# Patient Record
Sex: FEMALE | Race: BLACK OR AFRICAN AMERICAN
[De-identification: names, ages, dates, MRNs, and addresses within clinical notes are randomized per-mention and may not be internally consistent; named-entity substitution may affect disease eponyms.]

---

## 2017-12-18 ENCOUNTER — HOSPITAL ENCOUNTER (OUTPATIENT)
Dept: HOSPITAL 61 - KCIC MAMMO | Age: 67
Discharge: HOME | End: 2017-12-18
Attending: FAMILY MEDICINE
Payer: COMMERCIAL

## 2017-12-18 DIAGNOSIS — Z12.31: Primary | ICD-10-CM

## 2017-12-18 NOTE — [facility]
Bilateral digital screening mammograms:

 

Reason for examination: Routine screening.

 

Comparison is made to previous studies dated 12/7/2016 and 12/30/2015.

 

Interpretation was made with the benefit of CAD.

 

The skin and nipples show no abnormalities. No abnormal lymph nodes are 

seen. The breast parenchyma is predominantly fatty. (Breast density: 

Category A.) There are no dominant masses, suspicious calcifications or 

architectural distortions. Benign appearing calcifications are again seen.

 

Impression:

 

No evidence of malignancy. Recommend routine screening.

 

BI-RADS Category 2:  Benign.

 

"Our facility is accredited by the [employer] 

Mammography Program."

 

This patient's information has been entered into a reminder system for the

patient to be notified with the results of her examination and a target 

date for the next mammogram.

 

Electronically signed by: Chana Klein MD (12/18/2017 2:19 PM) [id number]

## 2018-09-05 NOTE — EKG
[facility]

              [address] [place] [address]

Test Date:    2018               Test Time:    22:47:20

Pat Name:     MARISSA BACON           Department:   

Patient ID:   [id number]           Room:         258 1

Gender:       F                        Technician:   CQ

:          1950               Requested By: LUIS NEGRETE

Order Number: [id number]           Reading MD:   Luis Negrete MD

                                 Measurements

Intervals                              Axis          

Rate:         104                      P:            0

GA:           132                      QRS:          -5

QRSD:         104                      T:            132

QT:           366                                    

QTc:          488                                    

                           Interpretive Statements

ATRIAL FIBRILLATION

NON-SPECIFIC ST/T CHANGES

Electronically Signed On 2018 9:04:36 CDT by Luis Negrete MD

## 2018-09-06 NOTE — PDOC2
DELICIA PACK APRN 9/6/18 0909:


CARDIAC CONSULT


DATE OF CONSULT


Date of Consult


DATE: 9/6/18 


TIME: 08:58





REASON FOR CONSULT


Reason for Consult:


New afib





REFERRING PHYSICIAN


Referring Physician:


Forrest





SOURCE


Source:  Chart review, Patient





HISTORY OF PRESENT ILLNESS


HISTORY OF PRESENT ILLNESS


This is a pleasant 67 yo female admitted for complains of chest pain and SOA.  

Reports that about 3 days ago she was having cramps to her thigh and butt. In 

the last few days this was then followed by midchest pressure which then went 

to her left neck and then have tingling to her left fingertips.  No low back 

pain and no LE pain with activities. Positive for SOA but no n/v and 

diaphoresis.  Denies any palpitations and no recent falls or injury.  Prior to 

this she denies any exertional CP and CONTI.  Denies any past VTE or arrhythmias 

but positive for remote CAD with stent placement as well as past CVA.  Denies 

any heartburn, [place] symptoms.  She does have occipital HA after she started 

having symptoms mentioned. Verbalized compliance with her medications. She went 

to her PCP yesterday and was noted with AFIB RVR and was directly admitted. To 

add she was suppose to have a stress test 2/2018 but there was a mixed up with 

her outpt copay and was too expensive for her.





PAST MEDICAL HISTORY


Cardiovascular:  CAD, HTN, Hyperlipidemia


Pulmonary:  No pertinent hx


CENTRAL NERVOUS SYSTEM:  CVA


GI:  GERD


Heme/Onc:  No pertinent hx


Hepatobiliary:  No pertinent hx


Psych:  No pertinent hx


Musculoskeletal:  Osteoarthritis


Rheumatologic:  No pertinent hx


Infectious disease:  No pertinent hx


ENT:  No pertinent hx


Renal/:  No pertinent hx


Endocrine:  No pertinent hx


Dermatology:  No pertinent hx





PAST SURGICAL HISTORY


Past Surgical History:  Hysterectomy, Other (remote PCI/stent)





FAMILY HISTORY


Family History


noncontributory





SOCIAL HISTORY


Smoke:  No


ALCOHOL:  none


Drugs:  None


Lives:  with Family





CURRENT MEDICATIONS


CURRENT MEDICATIONS





Current Medications








 Medications


  (Trade)  Dose


 Ordered  Sig/Venita


 Route


 PRN Reason  Start Time


 Stop Time Status Last Admin


Dose Admin


 


 Metoprolol


 Tartrate


  (Lopressor)  25 mg  Q6HRS


 PO


   9/5/18 18:00


    9/6/18 06:00





 


 Apixaban


  (Eliquis)  5 mg  1X  ONCE


 PO


   9/5/18 18:00


 9/5/18 18:01 DC 9/5/18 18:19





 


 Apixaban


  (Eliquis)  5 mg  1X  ONCE


 PO


   9/6/18 06:00


 9/6/18 06:01 DC 9/6/18 06:00





 


 Amlodipine


 Besylate


  (Norvasc)  10 mg  DAILY


 PO


   9/6/18 09:00


    9/6/18 07:44





 


 Aspirin


  (Ecotrin)  81 mg  DAILY


 PO


   9/6/18 09:00


    9/6/18 07:44





 


 Hydrochlorothiazide


  (Hydrodiuril)  25 mg  DAILY


 PO


   9/6/18 09:00


    9/6/18 07:44














ALLERGIES


ALLERGIES:  


Coded Allergies:  


     No Known Drug Allergies (Unverified , 9/5/18)





ROS


Review of System


14 point ROS evaluated with pertinent positives noted per HPI





PHYSICAL EXAM


General:  Alert, Oriented X3, Cooperative, No acute distress


HEENT:  Atraumatic, Mucous membr. moist/pink


Lungs:  Clear to auscultation, Normal air movement


Heart:  Other (SFIB)


Abdomen:  Soft, No tenderness


Extremities:  No cyanosis, No edema, Other (2+ DP, 2+ femoral 1+ popliteal 

bilaterally)


Skin:  No breakdown, No significant lesion


Neuro:  Normal speech, Sensation intact


Psych/Mental Status:  Mental status NL, Mood NL


MUSCULOSKELETAL:  Osteoarthritic changes both hands





VITALS


VITALS





Vital Signs








  Date Time  Temp Pulse Resp B/P (MAP) Pulse Ox O2 Delivery O2 Flow Rate FiO2


 


9/6/18 08:29    155/103 (120)    


 


9/6/18 07:44  115      


 


9/6/18 07:30 98.4  20  97 Nasal Cannula 2.0 





 98.4       











LABS


Lab:





Laboratory Tests








Test


 9/5/18


18:15 9/6/18


00:45 9/6/18


06:10 9/6/18


06:40


 


White Blood Count


 5.7 x10^3/uL


(4.0-11.0) 


 


 5.1 x10^3/uL


(4.0-11.0)


 


Red Blood Count


 5.20 x10^6/uL


(3.50-5.40) 


 


 5.00 x10^6/uL


(3.50-5.40)


 


Hemoglobin


 13.3 g/dL


(12.0-15.5) 


 


 12.7 g/dL


(12.0-15.5)


 


Hematocrit


 40.4 %


(36.0-47.0) 


 


 39.1 %


(36.0-47.0)


 


Mean Corpuscular Volume 78 fL ()    78 fL () 


 


Mean Corpuscular Hemoglobin 26 pg (25-35)    25 pg (25-35) 


 


Mean Corpuscular Hemoglobin


Concent 33 g/dL


(31-37) 


 


 33 g/dL


(31-37)


 


Red Cell Distribution Width


 16.4 %


(11.5-14.5) 


 


 16.3 %


(11.5-14.5)


 


Platelet Count


 204 x10^3/uL


(140-400) 


 


 215 x10^3/uL


(140-400)


 


Neutrophils (%) (Auto) 67 % (31-73)    54 % (31-73) 


 


Lymphocytes (%) (Auto) 27 % (24-48)    36 % (24-48) 


 


Monocytes (%) (Auto) 4 % (0-9)    7 % (0-9) 


 


Eosinophils (%) (Auto) 1 % (0-3)    1 % (0-3) 


 


Basophils (%) (Auto) 1 % (0-3)    1 % (0-3) 


 


Neutrophils # (Auto)


 3.8 x10^3uL


(1.8-7.7) 


 


 2.8 x10^3uL


(1.8-7.7)


 


Lymphocytes # (Auto)


 1.5 x10^3/uL


(1.0-4.8) 


 


 1.8 x10^3/uL


(1.0-4.8)


 


Monocytes # (Auto)


 0.3 x10^3/uL


(0.0-1.1) 


 


 0.4 x10^3/uL


(0.0-1.1)


 


Eosinophils # (Auto)


 0.1 x10^3/uL


(0.0-0.7) 


 


 0.1 x10^3/uL


(0.0-0.7)


 


Basophils # (Auto)


 0.0 x10^3/uL


(0.0-0.2) 


 


 0.1 x10^3/uL


(0.0-0.2)


 


Sodium Level


 141 mmol/L


(136-145) 


 138 mmol/L


(136-145) 





 


Potassium Level


 3.4 mmol/L


(3.5-5.1) 


 3.4 mmol/L


(3.5-5.1) 





 


Chloride Level


 106 mmol/L


() 


 108 mmol/L


() 





 


Carbon Dioxide Level


 25 mmol/L


(21-32) 


 24 mmol/L


(21-32) 





 


Anion Gap 10 (6-14)   6 (6-14)  


 


Blood Urea Nitrogen


 13 mg/dL


(7-20) 


 10 mg/dL


(7-20) 





 


Creatinine


 0.8 mg/dL


(0.6-1.0) 


 0.8 mg/dL


(0.6-1.0) 





 


Estimated GFR


(Cockcroft-Gault) 86.3 


 


 86.3 


 





 


BUN/Creatinine Ratio 16 (6-20)    


 


Glucose Level


 137 mg/dL


(70-99) 


 106 mg/dL


(70-99) 





 


Calcium Level


 9.8 mg/dL


(8.5-10.1) 


 9.2 mg/dL


(8.5-10.1) 





 


Total Bilirubin


 0.5 mg/dL


(0.2-1.0) 


 


 





 


Aspartate Amino Transf


(AST/SGOT) 17 U/L (15-37) 


 


 


 





 


Alanine Aminotransferase


(ALT/SGPT) 25 U/L (14-59) 


 


 


 





 


Alkaline Phosphatase


 72 U/L


() 


 


 





 


Creatine Kinase


 58 U/L


() 


 


 





 


Creatine Kinase MB (Mass)


 1.0 ng/mL


(0.0-3.6) 


 


 





 


Creatine Kinase MB Relative


Index  % (0-4) 


 


 


 





 


Troponin I Quantitative


 < 0.017 ng/mL


(0.000-0.055) < 0.017 ng/mL


(0.000-0.055) < 0.017 ng/mL


(0.000-0.055) 





 


Total Protein


 7.5 g/dL


(6.4-8.2) 


 


 





 


Albumin


 3.6 g/dL


(3.4-5.0) 


 


 





 


Albumin/Globulin Ratio 0.9 (1.0-1.7)    











ASSESSMENT/PLAN


ASSESSMENT/PLAN


1. AFIB RVR: new onset


2. Accelerated HTN


3. CAD: remote stents x2


4. Hx of CVA


5. HLP





Recommendations


1. LUCY/CVN today, discussed risks and benefits and agreeable to proceed. 


2. Pt has received eliquis last night and this am. Discussed anticoagulation 

with eliquis and agreeable for stroke prevention


3. MPI tomorrow.  Will arrange for outpt event monitor


4. Continue metoprolol.  Norvasc/HCTZ in place and will replace K. Will reeval 

meds further after testing completion. 


5. Check Mg, TSH, and lipids.





WISAM NEGRETE MD 9/6/18 1022:


CARDIAC CONSULT


ASSESSMENT/PLAN


ASSESSMENT/PLAN


Pt. seen and examined. Agree with above NP note. 


Known to our service. Presenting with afib with rvr


I discussed with the patient and her daughter about various mgmt options 

including rate control +anticoagulation versus LUCY/CVN versus ablation down the 

road. 


She understands the risks/benefits of all the options and wishes to proceed 

with LUCY/CVN


She has been given anticoagulation with eliquis and will continue this post-

procedure.











DELICIA PACK Sep 6, 2018 09:09


WISAM NEGRETE MD Sep 6, 2018 10:22

## 2018-09-06 NOTE — EKG
[facility]

              [address] [place] [address]

Test Date:    2018               Test Time:    07:12:03

Pat Name:     MARISSA BACON           Department:   

Patient ID:   [id number]           Room:         258 1

Gender:       F                        Technician:   TROY

:          1950               Requested By: MARK CARIAS

Order Number: [id number]           Reading MD:   Luis Lambert MD

                                 Measurements

Intervals                              Axis          

Rate:         108                      P:            

MT:                                    QRS:          -5

QRSD:         102                      T:            88

QT:           362                                    

QTc:          489                                    

                           Interpretive Statements

ATRIAL FIBRILLATION WITH RVR

Electronically Signed On 2018 9:12:00 CDT by Luis Lambert MD

## 2018-09-06 NOTE — CARD
MR#: [id number]

Account#: [id number]

Accession#: [id number]

Date of Study: 09/06/2018

Ordering Physician: WISAM LAMBERT, 

Referring Physician: MARK CARIAS, 

Tech: ADONAY Delgado





--------------- APPROVED REPORT --------------





EXAM: Transesophageal echocardiogram with color flow Doppler and Synchronized Cardioversion.



INDICATION

Cardioversion



Reason For Test : Rule out Intracardiac Thrombus.



PROCEDURE

After obtaining informed consent, patient underwent transesophageal echo in the PACU.

Type of Sedation : General Anesthesia

Sedation was administered by Zuleima Cam. 

Sedation was achieved with Propofol 70mg intravenously. 

Transesophageal probe was inserted and advanced into esophagus by Benjamin Lambert MD.

The LUCY was performed without complications. 

Synchronized Cardioversion attempted: Successful 

Synchronized Cardioversion acheived with 200 Joules after 1 attempt(s). 

Throughout the procedure, the blood pressure, pulse oximetry, cardiac rhythm, and rate were monitored
.

The patient tolerated the procedure without adverse effects. Recovery from general anesthesia was une
ventful and vital signs were stable.



 LEFT VENTRICLE 

The left ventricle is normal size. There is normal left ventricular wall thickness. The systolic func
tion is mildly impaired. EF 45% Mild global hypokinesis. No left ventricle thrombus noted on this inge
dy. There is no ventricular septal defect visualized.



 RIGHT VENTRICLE 

The right ventricle is normal size. There is normal right ventricular wall thickness. The right ventr
icular systolic function is normal.



 ATRIA 

The left atrium is mildly dilated. The right atrium is mildly dilated. The interatrial septum is inta
ct with no evidence for an atrial septal defect or patent foramen ovale as noted on 2-D or Doppler im
aging. There is no thrombus noted in the left atrial appendage.



 AORTIC VALVE 

The aortic valve is mildly thickened but opens well. Doppler and Color Flow revealed no significant a
ortic regurgitation. There is no significant aortic valvular stenosis. There is no aortic valvular ve
getation.



 MITRAL VALVE 

The mitral valve is normal in structure, There is no evidence of mitral valve prolapse. There is no m
itral valve stenosis. Doppler and Color-flow revealed trace to mild mitral regurgitation.



 TRICUSPID VALVE 

The tricuspid valve leaflets are thickened , but open well. Doppler and Color Flow revealed mild tric
uspid regurgitation. There is no tricuspid valve prolapse or vegetation. There is no tricuspid valve 
stenosis.



 PULMONIC VALVE 

The pulmonic valve is not well visualized. Doppler and Color Flow revealed no pulmonic valvular regur
gitation. There is no pulmonic valvular stenosis.



 GREAT VESSELS 

The aortic root is normal in size. The IVC is normal in size and collapses >50% with inspiration.



Critical Notification

Critical Value: No



<Conclusion>

The systolic function is mildly impaired. EF 45%

Mild global hypokinesis.

There is no thrombus noted in the left atrial appendage.

Successful cardioversion to SR. 



Signed by : Wisam Lambert, 

Electronically Approved : 09/06/2018 13:19:18

## 2018-09-06 NOTE — HP
ADMIT DATE:  2018



CHIEF COMPLAINT:  Chest pain.



HISTORY OF PRESENT ILLNESS AND HOSPITAL COURSE:  This patient is a 68-year-old

-American female who came in for routine followup in the clinic.  Most

recently, she was having back pain, but was also having chest pain and some

shortness of breath.  She was scheduled and did see Dr. Lambert in 2018 and

MPI was recommended.  Due to scheduling and financial error, the patient did not

receive medical stress at that time and states that she continued to have

intermittent symptoms.  During office evaluation, she was found to have

tachycardia and EKG confirmed new onset atrial fibrillation with rapid

ventricular response.  Due to this finding, she was admitted to the hospital for

further evaluation and treatment of atrial fibrillation.



PAST MEDICAL HISTORY:  Significant for:

1.  Hypertension.

2.  Remote history of CVA.

3.  High cholesterol.

4.  Asthma.

5.  Osteoarthritis.

6.  Venous insufficiency of lower extremities.

7.  Diet controlled diabetes.



FAMILY HISTORY:  Mother is  with hypertension complications at age 67. 

Father is  with coronary artery disease and hypertension at age 64.  She

has a sister who is  with heart disease at an early age of 42.



SOCIAL HISTORY:  The patient has never smoked.  She does not use alcohol.  She

lives alone.  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  She is  with

social support from daughter and granddaughter and sister.



REVIEW OF SYSTEMS:  The patient denies significant shortness of breath, but does

have intermittent chest pain.  She also has neck pain and low back pain for

which she has been seen in the office.  She denies any nausea, vomiting,

diarrhea, cough, congestion, fever, chills, recent weight gain or weight loss.



PHYSICAL EXAMINATION:

GENERAL:  This is a well-nourished, well-developed -American female, in

no apparent distress on my exam.  She is alert and oriented x 3.

HEENT:  Benign.

NECK:  Supple without bruit.

CARDIAC:  Irregularly irregular.

LUNGS:  Clear.

ABDOMEN:  Soft, nontender.

EXTREMITIES:  2+ pulses, 1+ edema.

NEUROLOGIC:  Showed no unilateral findings.



ASSESSMENT:

1.  New onset atrial fibrillation.

2.  Atypical chest pain.

3.  Essential hypertension.

4.  Type 2 diabetes with diet-controlled.

5.  High cholesterol.

6.  Chronic low back pain.



PLAN:  To proceed with cardiology consultation and treatment for atrial

fibrillation as well as evaluation and workup for coronary artery disease.

 



______________________________

MARK CARIAS MD



DR:  RUPERT/lisseth  JOB#:  [id number] / [id number]

DD:  2018 18:31  DT:  2018 19:09

## 2018-09-06 NOTE — RAD
Portable chest, 9/5/2018:

 

HISTORY: Chest pain, hypertension

 

The heart is at the upper limits of normal in size. The pulmonary 

vascularity is normal. No pulmonary infiltrate is seen. There is no 

evidence of pleural fluid. Moderate spurring is present in the spine.

 

IMPRESSION:

1. Borderline cardiomegaly.

2. No acute infiltrates.

 

Electronically signed by: Rick Moritz, MD (9/6/2018 7:40 AM) [facility]

## 2018-09-06 NOTE — EKG
[facility]

              [address] [place] [address]

Test Date:    2018               Test Time:    13:18:08

Pat Name:     MARISSA BACON           Department:   

Patient ID:   [id number]           Room:         258 1

Gender:       F                        Technician:   SILVER

:          1950               Requested By: LUIS NEGRETE

Order Number: [id number]           Reading MD:   Luis Negrete MD

                                 Measurements

Intervals                              Axis          

Rate:         65                       P:            42

LA:           180                      QRS:          -20

QRSD:         104                      T:            111

QT:           404                                    

QTc:          421                                    

                           Interpretive Statements

SINUS RHYTHM

PROBABLE LVH

NON-SPECIFIC ST/T CHANGES

Electronically Signed On 2018 6:50:42 CDT by Luis Negrete MD

## 2018-09-07 NOTE — RAD
MR#: [id number]

Account#: [id number]

Accession#: [id number]

Date of Study: 09/07/2018

Ordering Physician: DELICIA PACK, 

Referring Physician: LEOBARDO CISNEROS Tech: RT Prudencio (R) (N)





--------------- APPROVED REPORT --------------





Test Type:          Pharmacological

Stress Nurse/Tech: KELLEY Chase

Test Indications: CAD, CP, hx A fib

Cardiac History: stents, CVA

Medications:     See Electronic Medical Record

Medical History: See Electronic Medical Record

Resting ECG:     SR

Resting Heart Rate: 71 bpm

Resting Blood Pressure: 132/73mmHg

Pretest Chest Pain: None



Nurse/Tech Notes

Lungs CTA, S1S2

Consent: The procedure was explained to the patient in lay terms. Informed consent was witnessed. Perez
eout was entered into [employer]. History and Stress Test performed by KELLEY Chase RN



Pharm. Details

Pharmacologic stress testing was performed using 0.4mg per 5ml of regadenoson given intravenously ove
r 7-10 seconds.



Stress Symptoms

dyspnea



POST EXERCISE

Reason for Termination: Infusion complete

Max HR: 132 bpm

Max Blood Pressure: 136/71mmHg

Blood Pressure response to exercise: Normal blood pressure response during stress.

Heart Rate response to exercise: normal response

Chest Pain: No. 

Arrhythmia: No. 

ST Change: No. 



INTERPRETATION

Stress EKG Conclusion: No evidence of stress induced EKG changes. 



Imaging Protocol

IMAGE PROTOCOL: Rest Tc-99m/stress Tc-99m 1 day



Rest:            Stress:         Viability:   

Radiopharm.Tc99m MzqvdzuslTk71c Sestamibi

Txlt68tVp            35mCi            

Duration    14min.           14min.           

Img Date  09/07/2018 09/07/2018      

Inj-Img Wyzi35pep.           60min.           



Rest Admin Site:IV - Left ForearmAdministrator:RT Prudencio (R)(N)

Stress Admin Site: IV - Left ForearmAdministrator: LEOBARDO WardTCB, ARRT (R)(N)



STRESS DATA

End Diast. Vol.122.0mlLVEDV index BSA61.0ml

End Syst. Vol.59.0mlLVESV index BSA30.0ml

Myocardial Xfhj005.0gEject. Xrysltzg80.0%



Stress Scores

Regional WT3.00Summed WT17.00

Regional WM0.00Summed WM12.00



LV Perfusion

There is a small sized, reversible perfusion defect involving the inferior wall, suggestive of artifa
ct but cannot rule out mildly impaired perfusion. 



Wall Motion

Mild global hypokinesis. EF 52%



LV Perf. Quant

17 Seg. SSS9.00

17 Seg. SRS1.00

17 Seg. SDS8.00

Stress Defect Extent (% LAD)10.00Rest Defect Extent (% LAD)0.60Rev. Defect Extent (% LAD)1.30

Stress Defect Extent (% LCX) 25.00Rest Defect Extent (% LCX)0.00Rev. Defect Extent (% LCX)18.80

Stress Defect Extent (% RCA)5.60Rest Defect Extent (% RCA)0.00Rev. Defect Extent (% RCA)5.60

Stress Defect Extent (% WOLFGANG)14.60Rest Defect Extent (% WOLFGANG)0.20Rev. Defect Extent (% WOLFGANG)9.30



Other Information

Quality:Average

Risk Assessment: Low-Moderate Risk



Conclusion

1. No evidence of EKG changes with stress testing.

2. Small reversible inferior defect, likely artifact.

3. Low to moderate risk study.

4. EF is low normal at 52%.



Signed by : Luis Lambert, 

Electronically Approved : 09/07/2018 11:56:23

## 2018-09-07 NOTE — PDOC
CARDIO Progress Notes


Date and Time


Date of Service


9/7/2018


Time of Evaluation


1200





Subjective


Subjective:  No Chest Pain, No shortness of breath, No Palpitations





Vitals


Vitals





Vital Signs








  Date Time  Temp Pulse Resp B/P (MAP) Pulse Ox O2 Delivery O2 Flow Rate FiO2


 


9/7/18 11:00 98.7 68  138/62 (87) 95   





 98.7       


 


9/7/18 07:30      Nasal Cannula 2.0 


 


9/7/18 07:30   18     








Weight


Weight [ ]





Input and Output


Intake and Output











Intake and Output 


 


 9/7/18





 07:00


 


Intake Total 850 ml


 


Output Total 1475 ml


 


Balance -625 ml


 


 


 


Intake Oral 750 ml


 


IV Total 100 ml


 


Output Urine Total 1475 ml


 


# Voids 3











Physical Exam


HEENT:  Neck Supple W Full Motion


Chest:  Symmetric


LUNGS:  Clear to Auscultation


Heart:  S1S2, RRR (SR)


Abdomen:  Soft N/T


Extremities:  No Calf Tenderness


Neurology:  alert, oriented, follow commands





Assessment


Assessment


1. AFIB RVR: new onset. S/P LUCY/CVN maintaining SR


2. Accelerated HTN: now controlled


3. CAD: remote stents x2. MPI Small reversible inferior defect, likely artifact 

with EF at 52%


4. Hx of CVA


5. HLP





Recommendations


1. Continue with metoprolol and BP regimen. Start on amiodarone 200 mg po bid 

then daily. 


2. Eliquis for stroke prevention. 1 mo samples given. F/U in 1 month. 


3. Will consider for outpt event monitor. 


4. Continue statin, ASA.











DELICIA PACK APRN Sep 7, 2018 14:44

## 2018-09-07 NOTE — PDOC
PROGRESS NOTES


Subjective


Subjective


Patient feeling well except for complaining of low back and leg pain. Patient 

had transesophageal echo which was negative with anticoagulation and follow-up 

cardioversion back to sinus rhythm. Patient planned for MPI today.





Objective


Objective





Vital Signs








  Date Time  Temp Pulse Resp B/P (MAP) Pulse Ox O2 Delivery O2 Flow Rate FiO2


 


9/7/18 10:52  68  123/62    


 


9/7/18 07:30      Nasal Cannula 2.0 


 


9/7/18 07:30 98.7  18     





 98.7       


 


9/7/18 04:00     95   














Intake and Output 


 


 9/7/18





 07:00


 


Intake Total 850 ml


 


Output Total 1475 ml


 


Balance -625 ml


 


 


 


Intake Oral 750 ml


 


IV Total 100 ml


 


Output Urine Total 1475 ml


 


# Voids 3











Physical Exam


Abdomen:  Normal bowel sounds


Heart:  Regular rate


Extremities:  No edema


General:  Alert


Lungs:  Clear to auscultation





Assessment


Assessment


1.  New onset atrial fibrillation.


2.  Atypical chest pain.


3.  Essential hypertension.


4.  Type 2 diabetes with diet-controlled.


5.  High cholesterol.


6.  Chronic low back pain


7.  Hypokalemia.





Plan


Plan of Care


Continue cardiac eval.


Supplement potassium


Proceed with PT and OT modalities.





Comment


Review of Relevant


I have reviewed the following items afshan (where applicable) has been applied.


Labs





Laboratory Tests








Test


 9/5/18


18:15 9/6/18


00:45 9/6/18


06:10 9/6/18


06:40


 


White Blood Count


 5.7 x10^3/uL


(4.0-11.0) 


 


 5.1 x10^3/uL


(4.0-11.0)


 


Red Blood Count


 5.20 x10^6/uL


(3.50-5.40) 


 


 5.00 x10^6/uL


(3.50-5.40)


 


Hemoglobin


 13.3 g/dL


(12.0-15.5) 


 


 12.7 g/dL


(12.0-15.5)


 


Hematocrit


 40.4 %


(36.0-47.0) 


 


 39.1 %


(36.0-47.0)


 


Mean Corpuscular Volume 78 fL ()    78 fL () 


 


Mean Corpuscular Hemoglobin 26 pg (25-35)    25 pg (25-35) 


 


Mean Corpuscular Hemoglobin


Concent 33 g/dL


(31-37) 


 


 33 g/dL


(31-37)


 


Red Cell Distribution Width


 16.4 %


(11.5-14.5) 


 


 16.3 %


(11.5-14.5)


 


Platelet Count


 204 x10^3/uL


(140-400) 


 


 215 x10^3/uL


(140-400)


 


Neutrophils (%) (Auto) 67 % (31-73)    54 % (31-73) 


 


Lymphocytes (%) (Auto) 27 % (24-48)    36 % (24-48) 


 


Monocytes (%) (Auto) 4 % (0-9)    7 % (0-9) 


 


Eosinophils (%) (Auto) 1 % (0-3)    1 % (0-3) 


 


Basophils (%) (Auto) 1 % (0-3)    1 % (0-3) 


 


Neutrophils # (Auto)


 3.8 x10^3uL


(1.8-7.7) 


 


 2.8 x10^3uL


(1.8-7.7)


 


Lymphocytes # (Auto)


 1.5 x10^3/uL


(1.0-4.8) 


 


 1.8 x10^3/uL


(1.0-4.8)


 


Monocytes # (Auto)


 0.3 x10^3/uL


(0.0-1.1) 


 


 0.4 x10^3/uL


(0.0-1.1)


 


Eosinophils # (Auto)


 0.1 x10^3/uL


(0.0-0.7) 


 


 0.1 x10^3/uL


(0.0-0.7)


 


Basophils # (Auto)


 0.0 x10^3/uL


(0.0-0.2) 


 


 0.1 x10^3/uL


(0.0-0.2)


 


Sodium Level


 141 mmol/L


(136-145) 


 138 mmol/L


(136-145) 





 


Potassium Level


 3.4 mmol/L


(3.5-5.1) 


 3.4 mmol/L


(3.5-5.1) 





 


Chloride Level


 106 mmol/L


() 


 108 mmol/L


() 





 


Carbon Dioxide Level


 25 mmol/L


(21-32) 


 24 mmol/L


(21-32) 





 


Anion Gap 10 (6-14)   6 (6-14)  


 


Blood Urea Nitrogen


 13 mg/dL


(7-20) 


 10 mg/dL


(7-20) 





 


Creatinine


 0.8 mg/dL


(0.6-1.0) 


 0.8 mg/dL


(0.6-1.0) 





 


Estimated GFR


(Cockcroft-Gault) 86.3 


 


 86.3 


 





 


BUN/Creatinine Ratio 16 (6-20)    


 


Glucose Level


 137 mg/dL


(70-99) 


 106 mg/dL


(70-99) 





 


Calcium Level


 9.8 mg/dL


(8.5-10.1) 


 9.2 mg/dL


(8.5-10.1) 





 


Total Bilirubin


 0.5 mg/dL


(0.2-1.0) 


 


 





 


Aspartate Amino Transf


(AST/SGOT) 17 U/L (15-37) 


 


 


 





 


Alanine Aminotransferase


(ALT/SGPT) 25 U/L (14-59) 


 


 


 





 


Alkaline Phosphatase


 72 U/L


() 


 


 





 


Creatine Kinase


 58 U/L


() 


 


 





 


Creatine Kinase MB (Mass)


 1.0 ng/mL


(0.0-3.6) 


 


 





 


Creatine Kinase MB Relative


Index  % (0-4) 


 


 


 





 


Troponin I Quantitative


 < 0.017 ng/mL


(0.000-0.055) < 0.017 ng/mL


(0.000-0.055) < 0.017 ng/mL


(0.000-0.055) 





 


Total Protein


 7.5 g/dL


(6.4-8.2) 


 


 





 


Albumin


 3.6 g/dL


(3.4-5.0) 


 


 





 


Albumin/Globulin Ratio 0.9 (1.0-1.7)    


 


Magnesium Level


 


 


 2.2 mg/dL


(1.8-2.4) 





 


Triglycerides Level


 


 


 122 mg/dL


(0-150) 





 


Cholesterol Level


 


 


 173 mg/dL


(0-200) 





 


LDL Cholesterol, Calculated


 


 


 105 mg/dL


(0-100) 





 


VLDL Cholesterol, Calculated


 


 


 24 mg/dL


(0-40) 





 


Non-HDL Cholesterol Calculated


 


 


 129 mg/dL


(0-129) 





 


HDL Cholesterol


 


 


 44 mg/dL


(40-60) 





 


Cholesterol/HDL Ratio   3.9  








Medications





Current Medications


Enoxaparin Sodium (Lovenox 40mg Syringe) 40 mg Q24H SQ ;  Start 9/5/18 at 21:00

;  Stop 9/5/18 at 21:00;  Status DC


Metoprolol Tartrate (Lopressor) 25 mg Q6HRS PO  Last administered on 9/7/18at 06

:28;  Start 9/5/18 at 18:00;  Stop 9/7/18 at 09:16;  Status DC


Apixaban (Eliquis) 5 mg 1X  ONCE PO  Last administered on 9/5/18at 18:19;  

Start 9/5/18 at 18:00;  Stop 9/5/18 at 18:01;  Status DC


Apixaban (Eliquis) 5 mg 1X  ONCE PO  Last administered on 9/6/18at 06:00;  

Start 9/6/18 at 06:00;  Stop 9/6/18 at 06:01;  Status DC


Amlodipine Besylate (Norvasc) 10 mg DAILY PO  Last administered on 9/7/18at 09:

51;  Start 9/6/18 at 09:00


Aspirin (Ecotrin) 81 mg DAILY PO  Last administered on 9/7/18at 09:51;  Start 9/ 6/18 at 09:00


Hydrochlorothiazide (Hydrodiuril) 25 mg DAILY PO  Last administered on 9/7/18at 

09:51;  Start 9/6/18 at 09:00


Non-Formulary Medication (Carvedilol ) 1 tab BID PO ;  Start 9/5/18 at 21:00;  

Status UNV


Potassium Chloride (Klor-Con) 20 meq 1X  ONCE PO  Last administered on 9/6/18at 

16:34;  Start 9/6/18 at 09:00;  Stop 9/6/18 at 09:01;  Status DC


Sodium Chloride (Normal Saline Flush) 10 ml QSHIFT  PRN IV AFTER MEDS AND BLOOD 

DRAWS;  Start 9/6/18 at 09:00


Apixaban (Eliquis) 5 mg BID PO  Last administered on 9/7/18at 09:51;  Start 9/6/ 18 at 21:00


Info (Anti-Coagulation Monitoring By Pharmacy) 1 each PRN DAILY  PRN [facility] SEE 

COMMENTS Last administered on 9/6/18at 17:06;  Start 9/6/18 at 09:30


Metoprolol Tartrate (Lopressor Vial) 5 mg 1X  ONCE IVP  Last administered on 9/6 /18at 10:30;  Start 9/6/18 at 10:30;  Stop 9/6/18 at 10:31;  Status DC


Lidocaine HCl (Viscous Lidocaine) 15 ml 1X  ONCE SWSW ;  Start 9/6/18 at 11:00;

  Stop 9/6/18 at 11:02;  Status DC


Benzocaine (Hurricaine One) 3 spray 1X  ONCE MM ;  Start 9/6/18 at 11:00;  Stop 

9/6/18 at 11:02;  Status DC


Benzocaine (Hurricaine One) 1 spray STK-MED ONCE .ROUTE ;  Start 9/6/18 at 11:02

;  Stop 9/6/18 at 11:03;  Status DC


Lidocaine HCl (Xylocaine 2% Topical 5gm Tube) 5 vahe STK-MED ONCE TP ;  Start 9/6 /18 at 11:02;  Stop 9/6/18 at 11:03;  Status DC


Lidocaine HCl (Viscous Lidocaine) 15 ml STK-MED ONCE .ROUTE ;  Start 9/6/18 at 

11:02;  Stop 9/6/18 at 11:03;  Status DC


Ondansetron HCl (Zofran) 4 mg PRN Q6HRS  PRN IV NAUSEA/VOMITING;  Start 9/6/18 

at 11:30;  Stop 9/7/18 at 11:29


Fentanyl Citrate (Fentanyl 2ml Vial) 25 mcg PRN Q5MIN  PRN IV MILD PAIN;  Start 

9/6/18 at 11:30;  Stop 9/7/18 at 11:29


Fentanyl Citrate (Fentanyl 2ml Vial) 50 mcg PRN Q5MIN  PRN IV MODERATE TO 

SEVERE PAIN;  Start 9/6/18 at 11:30;  Stop 9/7/18 at 11:29


Morphine Sulfate (Morphine Sulfate) 1 mg PRN Q10MIN  PRN IV SEVERE PAIN;  Start 

9/6/18 at 11:30;  Stop 9/7/18 at 11:29


Ringer's Solution 1,000 ml @  30 mls/hr Q24H IV  Last administered on 9/6/18at 

11:25;  Start 9/6/18 at 11:25;  Stop 9/6/18 at 23:24;  Status DC


Lidocaine HCl (Xylocaine-Mpf 1% 2ml Vial) 2 ml 1X PRN  PRN ID IV START;  Start 9 /6/18 at 11:30;  Stop 9/7/18 at 11:29


Hydromorphone HCl (Dilaudid) 0.5 mg PRN Q10MIN  PRN IV SEV PAIN, Second choice;

  Start 9/6/18 at 11:30;  Stop 9/7/18 at 11:29


Prochlorperazine Edisylate (Compazine) 5 mg PACU PRN  PRN IV NAUSEA, MRX1;  

Start 9/6/18 at 11:30;  Stop 9/7/18 at 11:29


Propofol 20 ml @ As Directed STK-MED ONCE IV ;  Start 9/6/18 at 11:58;  Stop 9/6 /18 at 11:59;  Status DC


Benzonatate (Tessalon Perle) 100 mg Q8HRS  PRN PO COUGH Last administered on 9/7 /18at 06:28;  Start 9/6/18 at 16:45


Acetaminophen (Tylenol) 1,000 mg PRN Q6HRS  PRN PO HEADACHE Last administered 

on 9/6/18at 22:49;  Start 9/6/18 at 22:45


Regadenoson (Lexiscan) 0.4 mg 1X  ONCE IV  Last administered on 9/7/18at 09:17;

  Start 9/7/18 at 08:00;  Stop 9/7/18 at 08:01;  Status DC


Metoprolol Tartrate (Lopressor) 50 mg BID PO  Last administered on 9/7/18at 10:

52;  Start 9/7/18 at 10:30





Active Scripts


Active


Reported


Aspir-Low (Aspirin) 81 Mg Tablet.dr 1 Tab PO DAILY


Meloxicam 15 Mg Tablet 1 Tab PO DAILY


Hydrochlorothiazide Tablet  ** (Hydrochlorothiazide) 25 Mg Tablet 1 Tab PO DAILY


Norvasc (Amlodipine Besylate) 10 Mg Tablet 10 Mg PO DAILY


Carvedilol 25 Mg Tablet 1 Tab PO BID


Vitals/I & O





Vital Sign - Last 24 Hours








 9/6/18 9/6/18 9/6/18 9/6/18





 11:31 12:36 12:36 12:51


 


Temp 98.4  98.2 98.2





 98.4  98.2 98.2


 


Pulse 111  66 68


 


Resp 15  15 15


 


B/P (MAP) 156/96  107/74 97/66


 


Pulse Ox 97  95 96


 


O2 Delivery Nasal Cannula Nasal Cannula Nasal Cannula Nasal Cannula


 


O2 Flow Rate  2.0 2.0 2.0


 


    





    





 9/6/18 9/6/18 9/6/18 9/6/18





 13:06 13:21 13:45 14:00


 


Temp 98.2 98.2  





 98.2 98.2  


 


Pulse 65 64 85 70


 


Resp 15 20  


 


B/P (MAP) 116/74 129/74 138/81 (100) 141/79 (99)


 


Pulse Ox 98 97  


 


O2 Delivery Nasal Cannula Nasal Cannula  


 


O2 Flow Rate 2.0 2.0  


 


    





    





 9/6/18 9/6/18 9/6/18 9/6/18





 14:15 14:30 14:45 16:34


 


Pulse 70 72 66 80


 


B/P (MAP) 147/86 (106) 148/89 (108) 146/86 (106) 





 9/6/18 9/6/18 9/6/18 9/6/18





 19:15 19:54 22:15 23:13


 


Temp 98.4  99.5 





 98.4  99.5 


 


Pulse 68  73 73


 


Resp 20  20 


 


B/P (MAP) 129/61 (83)  124/76 (92) 124/76


 


Pulse Ox 97  99 


 


O2 Delivery Nasal Cannula Nasal Cannula Room Air 


 


O2 Flow Rate 2.0 2.0  


 


    





    





 9/7/18 9/7/18 9/7/18 9/7/18





 03:00 04:00 06:28 07:30


 


Temp  98.3  98.7





  98.3  98.7


 


Pulse 72 69 76 68


 


Resp  20  18


 


B/P (MAP)  135/74 (94) 149/65 123/62 (82)


 


Pulse Ox  95  


 


O2 Delivery  Room Air  Room Air


 


    





    





 9/7/18 9/7/18 9/7/18 





 07:30 09:51 10:52 


 


Pulse  68 68 


 


B/P (MAP)  123/62 123/62 


 


O2 Delivery Nasal Cannula   


 


O2 Flow Rate 2.0   














Intake and Output   


 


 9/6/18 9/6/18 9/7/18





 15:00 23:00 07:00


 


Intake Total 100 ml 250 ml 500 ml


 


Output Total 325 ml 650 ml 500 ml


 


Balance -225 ml -400 ml 0 ml

















MARK CARIAS MD Sep 7, 2018 11:27

## 2018-09-17 NOTE — DS
DATE OF DISCHARGE:  09/07/2018



ADMITTING DIAGNOSIS:  New-onset atrial fibrillation.



DISMISSAL DIAGNOSIS:  New-onset atrial fibrillation.



SECONDARY DIAGNOSES:

1.  Atypical chest pain.

2.  Hypertension.

3.  Type 2 diabetes, diet controlled.

4.  High cholesterol.

5.  Chronic back pain.



HISTORY OF PRESENT ILLNESS AND HOSPITAL COURSE:  This is a 68-year-old female

who presented to the office with back pain and atypical chest pain.  She was

found to have an irregular heartbeat and this was confirmed with EKG showing

new-onset atrial fibrillation.  Due to the constellation of the symptoms and

this patient's risk factors, she was admitted for rule out protocol and further

evaluation by Cardiology.  The patient was ruled out with medical stress testing

and underwent cardioversion after appropriate anticoagulation and

transesophageal echo.  She was back into sinus rhythm by discharge.



DISCHARGE MEDICATIONS:  Discharged on the following medications:  Amiodarone 200

mg b.i.d., Eliquis 5 mg b.i.d., atorvastatin 20 mg daily, potassium 10 mEq

daily, Zanaflex 4 mg q. 8 hours p.r.n., amlodipine 10 mg daily, aspirin 81 mg

daily, Coreg 25 mg b.i.d., hydrochlorothiazide 25 mg daily and meloxicam 15 mg

daily.



She will follow up with family practice clinic in 1 week and with Cardiology in

1-2 weeks for continued care.

 



______________________________

MARK CARIAS MD



DR:  RUPERT/lisseth  JOB#:  [id number] / [id number]

DD:  09/17/2018 12:55  DT:  09/17/2018 13:23

## 2018-12-19 NOTE — KCIC
EXAM: Bilateral screening mammogram.

 

HISTORY: 68-year-old female presents for screening mammography.

 

TECHNIQUE: Full-field digital craniocaudal and mediolateral oblique views 

of both breasts are obtained for evaluation. Computer aided detection with

BoastifyD software version 9.3 was applied.

 

COMPARISON: 12/18/2017 and 12/7/2016

 

BREAST PARENCHYMAL DENSITY: Level B - Scattered fibroglandular densities.

 

FINDINGS: There is no new suspicious mass, microcalcification or region of

architectural distortion. There is stable clustered microcalcifications 

within the 10:30 position of the right breast at mid to posterior depth 

and 3:00 and 2:00 positions of the left breast at mid to posterior depth. 

There are few additional scattered calcifications with similar morphology 

within both breasts. The 2 year course of stability favors benignity.

 

IMPRESSION: BI-RADS Category 2: Benign finding(s). 

 

RECOMMENDATION: Annual mammography is recommended.

 

If your mammogram demonstrates that you have dense breast tissue, which 

could hide abnormalities, and if you have other risk factors for breast 

cancer that have been identified, you might benefit from supplemental 

screening tests that may be suggested by your ordering physician.  Dense 

breast tissue, in and of itself, is a relatively common condition.  This 

information is not provided to cause undue concern, but rather to raise 

your awareness and to promote discussion with your physician regarding the

presence of other risk factors, in addition to dense breast tissue. A 

report of your mammography results will be sent to you and your physician.

You should contact your physician if you have any questions or concerns 

regarding this report.  

 

Mammography is a sensitive method for finding small breast cancers, but it

does not detect them all and is not a substitute for careful clinical 

examination.  A negative mammogram does not negate a clinically suspicious

finding and should not result in delay in biopsying a clinically 

suspicious abnormality.

 

PQRS compliance statement -  Patient information was entered into a 

reminder system with a target due date for the next mammogram. 

 

"Our facility is accredited by the [employer] 

Mammography Program."

 

Electronically signed by: Jolynn Monterroso MD (12/19/2018 11:06 AM) 

[id number]

## 2020-01-08 NOTE — KCIC
DATE: 12/23/2019



EXAM: MAMMO CYNTHIA SCREENING BILATERAL



HISTORY: Routine screening



COMPARISON: 12/19/2018, 12/18/2017, 12/7/2016, 12/30/2015 mammographic exams



This study was interpreted with the benefit of Computerized Aided Detection

(CAD).





Breast Density: SCATTERED The breast parenchyma shows scattered fibroglandular

densities. Breast parenchyma level B.





FINDINGS: Calcifications are present without suspicious change. These are

primarily within the left upper-outer breast. No masses or distortion in the

interval.  





IMPRESSION: Stable







BI-RADS CATEGORY: 1 NEGATIVE



RECOMMENDED FOLLOW-UP: 12M 12 MONTH FOLLOW-UP



PQRS compliance statement: Patient information was entered into a reminder

system with a target due date for the next mammogram.



Mammography is a sensitive method for finding small breast cancers, but it

does not detect them all and is not a substitute for careful clinical

examination.  A negative mammogram does not negate a clinically suspicious

finding and should not result in delay in biopsying a clinically suspicious

abnormality.



"Our facility is accredited by the American [employer] Mammography

Program."

## 2020-02-04 NOTE — CARD
MR#: [id number]

Account#: [id number]

Accession#: [id number]

Date of Study: 02/04/2020

Ordering Physician: WISAM LAMBERT, 

Referring Physician: WISAM LAMBERT, 

Tech: Kaye Lewis MACKENZIE





--------------- APPROVED REPORT --------------





EXAM: Two-dimensional and M-mode echocardiogram with Doppler and color Doppler.



Other Information 

Quality : GoodHR: 62bpm



INDICATION

PAF



RISK FACTORS

Hypertension 

Hyperlipidemia

CAD



2D DIMENSIONS 

RVDd3.0 (2.9-3.5cm)Left Atrium(2D)3.6 (1.6-4.0cm)

IVSd0.7 (0.7-1.1cm)Aortic Root(2D)2.9 (2.0-3.7cm)

LVDd5.5 (3.9-5.9cm)LVOT Diameter1.9 (1.8-2.4cm)

PWd0.9 (0.7-1.1cm)LVDs3.7 (2.5-4.0cm)

FS (%) 32.1 %SV86.8 ml

LVEF(%)59.6 (>50%)



Aortic Valve

AoV Peak Ran.206.7cm/sAoV VTI43.4cm

AO Peak GR.17.1mmHgLVOT Peak Ran.129.8cm/s

AO Mean GR.8mmHgAVA (VMAX)1.86cm2

AI P 1/2 [id number]



Mitral Valve

MV E Pgxcammr65.5cm/sMV DECEL [id number]

MV A Xbebpbei406.6cm/sE/A  Ratio0.7

MV A Elamzasp559sq



Pulmonary Valve

PV Peak Scxtlunz37.5cm/s



Tricuspid Valve

TR P. Banfsenc561pt/sRAP RZXXKTZS9ocPb

TR Peak Gr.34oeCmMJMV94rfUc



Pulmonary Vein

S1 Kcqvutcu26.4cm/sD2 Clgydbeb60.5cm/s

PVa imubiaok536kgjb



 LEFT VENTRICLE 

The left ventricle is upper limits of normal in size. There is normal left ventricular wall thickness
. The left ventricular systolic function is normal and the ejection fraction is within normal range. 
The Ejection Fraction is 55-60%. There is normal LV segmental wall motion. Transmitral Doppler flow p
attern is Grade I-abnormal relaxation pattern. There is no ventricular septal defect visualized.



 RIGHT VENTRICLE 

The right ventricle is normal size. The right ventricular systolic function is normal.



 ATRIA 

The left atrium is mildly dilated. The right atrium size is normal. The interatrial septum is intact 
with no evidence for an atrial septal defect or patent foramen ovale as noted on 2-D or Doppler imagi
ng.



 AORTIC VALVE 

The aortic valve is mildly calcified but opens well. Doppler and Color Flow revealed mild aortic regu
rgitation. There is no significant aortic valvular stenosis.



 MITRAL VALVE 

The mitral valve leaflets are mildly thickened. There is no evidence of mitral valve prolapse. There 
is no mitral valve stenosis. Doppler and Color-flow revealed trace to mild mitral regurgitation.



 TRICUSPID VALVE 

The tricuspid valve is normal in structure and function. Doppler and Color Flow revealed mild tricusp
id regurgitation. PAP is estimated at 32 mmHg. There is no tricuspid valve stenosis.



 PULMONIC VALVE 

Doppler and Color Flow revealed mild pulmonic valvular regurgitation. There is no pulmonic valvular s
tenosis.



 GREAT VESSELS 

The aortic root is normal in size. The ascending aorta is normal in size. The pulmonary artery is nor
mal. The IVC is normal in size and collapses >50% with inspiration.



 PERICARDIAL EFFUSION 

There is no pleural effusion. There is no evidence of significant pericardial effusion.



Critical Notification

Critical Value: No



<Conclusion>

The left ventricular systolic function is normal and the ejection fraction is within normal range. Th
e Ejection Fraction is 55-60%.

There is normal LV segmental wall motion.

Doppler and Color Flow revealed mild aortic regurgitation.

Doppler and Color-flow revealed trace to mild mitral regurgitation.

The IVC is normal in size and collapses >50% with inspiration.



Signed by : Wisam Lambert, 

Electronically Approved : 02/04/2020 11:03:38

## 2021-01-08 NOTE — KCIC
Bilateral digital screening mammograms and tomosynthesis



Reason for examination: Routine screening. 



Comparison is made to previous study dated December 23, 2020 and priors



Routine CC and MLO digital views obtained. Interpretation was made with the benefit of CAD.



The skin and nipples show no abnormalities. No abnormal lymph nodes are seen. The breast parenchyma i
s scattered fibroglandular elements. (Breast density: Category B.) There are no suspicious masses, pleitez
spicious calcifications or architectural distortions. Bilateral benign calcifications stable. Nodular
ity of the breast tissue is stable.



Impression:



Negative mammogram. Recommend routine screening.



BI-RADS Category 2: Benign.



"Our facility is accredited by the American [employer] Mammography Program."



This patient's information has been entered into a reminder system for the patient to be notified wit
h the results of her examination and a target date for the next mammogram.



Electronically signed by: Chase Rubio MD (1/8/2021 5:03 PM) [id number]

## 2021-05-28 NOTE — KCIC
EXAM: Lumbar spine, 5 views.



HISTORY: Pain.



COMPARISON: None.



FINDINGS: 5 views of the lumbar spine are obtained. There is lumbar hyperlordosis. There is grade 1 a
nterolisthesis of L4 and L5 and L5 on S1, measuring 6 mm and 6 mm. There is disc space narrowing and 
facet arthropathy at these levels. There is slight lumbarization of the S1 segment with a rudimentary
 S1-S2 disc. There is incidental cholelithiasis.



IMPRESSION: 

1. Degenerative change involving the lower lumbar spine.

2. Lumbar hyperlordosis and grade 1 anterolisthesis at the lower lumbar levels.

3. Incidental cholelithiasis.



Electronically signed by: Jolynn Monterroso MD (5/28/2021 10:33 AM) [id number]

## 2021-05-28 NOTE — KCIC
EXAM: Pelvis and left hip, 2 views.



HISTORY: Pain.



COMPARISON: None.



FINDINGS: A frontal view of the pelvis and frog-leg view of the left hip are obtained. There is no fr
acture, dislocation or subluxation. The femoral heads are normal in configuration. There is degenerat
frida change involving the lower lumbar spine. There is a suspected transitional lumbosacral segment.



IMPRESSION: No acute osseous finding.



Electronically signed by: Jolynn Monterroso MD (5/28/2021 10:31 AM) [id number]

## 2021-05-28 NOTE — KCIC
EXAM: Left knee, 3 views.



HISTORY: Pain.



COMPARISON: None.



FINDINGS: 3 views of the left knee are obtained. There is moderate medial and patellofemoral compartm
ent joint space narrowing with subchondral sclerosis and spurring. There is mild lateral compartment 
spurring. There is no joint effusion. There is no fracture, dislocation or subluxation.



IMPRESSION: Moderate medial and patellofemoral compartment and mild lateral compartment osteoarthriti
s of the left knee.



Electronically signed by: Jolynn Monterroso MD (5/28/2021 10:38 AM) [id number]

## 2022-01-12 NOTE — KCIC
Bilateral digital screening mammograms with 3-D tomosynthesis:



Reason for examination: Routine screening.



Comparison is made to previous studies dated back to 6/9/2015.



Bilateral mammograms in CC and oblique projections were obtained with 2-D imaging and 3-D tomosynthes
is imaging on a Siem[employer] Inspiration unit and reviewed on the workstation. Interpretation was made wit
h the benefit of CAD.



The skin and nipples show no abnormalities. No abnormal axillary lymph nodes are seen. The breast par
enchyma is predominantly fatty. (Breast density: Category A.) There are no dominant masses, suspiciou
s calcifications or architectural distortion. Benign scattered and clustered calcifications are prese
nt and appear to be stable.



Impression:



No evidence of malignancy. Recommend routine screening.



BI-RAD Category 2: Benign.



"Our facility is accredited by the [employer] Mammography Program."



This patient's information has been entered into a reminder system for the patient to be notified wit
h the results of her examination and a target date for the next mammogram.

 



Electronically signed by: Chana Klein MD (1/12/2022 3:25 PM) [id number]